# Patient Record
Sex: FEMALE | Race: BLACK OR AFRICAN AMERICAN | NOT HISPANIC OR LATINO | Employment: PART TIME | URBAN - METROPOLITAN AREA
[De-identification: names, ages, dates, MRNs, and addresses within clinical notes are randomized per-mention and may not be internally consistent; named-entity substitution may affect disease eponyms.]

---

## 2017-01-30 ENCOUNTER — HOSPITAL ENCOUNTER (OUTPATIENT)
Dept: LAB | Facility: MEDICAL CENTER | Age: 32
End: 2017-01-30
Attending: SPECIALIST
Payer: MEDICAID

## 2017-01-30 PROCEDURE — 87491 CHLMYD TRACH DNA AMP PROBE: CPT

## 2017-01-30 PROCEDURE — 87624 HPV HI-RISK TYP POOLED RSLT: CPT

## 2017-01-30 PROCEDURE — 88175 CYTOPATH C/V AUTO FLUID REDO: CPT

## 2017-01-30 PROCEDURE — 87591 N.GONORRHOEAE DNA AMP PROB: CPT

## 2017-02-02 LAB
C TRACH DNA GENITAL QL NAA+PROBE: NEGATIVE
CYTOLOGY REG CYTOL: ABNORMAL
HPV HR 12 DNA CVX QL NAA+PROBE: POSITIVE
HPV16 DNA SPEC QL NAA+PROBE: NEGATIVE
HPV18 DNA SPEC QL NAA+PROBE: NEGATIVE
N GONORRHOEA DNA GENITAL QL NAA+PROBE: NEGATIVE
SPECIMEN SOURCE: ABNORMAL
SPECIMEN SOURCE: ABNORMAL

## 2017-04-06 ENCOUNTER — HOSPITAL ENCOUNTER (OUTPATIENT)
Dept: LAB | Facility: MEDICAL CENTER | Age: 32
End: 2017-04-06
Attending: SPECIALIST
Payer: MEDICAID

## 2017-04-06 PROCEDURE — 88305 TISSUE EXAM BY PATHOLOGIST: CPT

## 2018-03-06 ENCOUNTER — HOSPITAL ENCOUNTER (EMERGENCY)
Dept: HOSPITAL 8 - ED | Age: 33
Discharge: HOME | End: 2018-03-06
Payer: MEDICAID

## 2018-03-06 VITALS — SYSTOLIC BLOOD PRESSURE: 115 MMHG | DIASTOLIC BLOOD PRESSURE: 70 MMHG

## 2018-03-06 VITALS — BODY MASS INDEX: 24.24 KG/M2 | HEIGHT: 65 IN | WEIGHT: 145.51 LBS

## 2018-03-06 DIAGNOSIS — R07.89: ICD-10-CM

## 2018-03-06 DIAGNOSIS — R20.2: ICD-10-CM

## 2018-03-06 DIAGNOSIS — G43.009: Primary | ICD-10-CM

## 2018-03-06 LAB
ALBUMIN SERPL-MCNC: 4 G/DL (ref 3.4–5)
ANION GAP SERPL CALC-SCNC: 8 MMOL/L (ref 5–15)
BASOPHILS # BLD AUTO: 0.03 X10^3/UL (ref 0–0.1)
BASOPHILS NFR BLD AUTO: 0 % (ref 0–1)
CALCIUM SERPL-MCNC: 8.8 MG/DL (ref 8.5–10.1)
CHLORIDE SERPL-SCNC: 108 MMOL/L (ref 98–107)
CREAT SERPL-MCNC: 0.96 MG/DL (ref 0.55–1.02)
EOSINOPHIL # BLD AUTO: 0.04 X10^3/UL (ref 0–0.4)
EOSINOPHIL NFR BLD AUTO: 1 % (ref 1–7)
ERYTHROCYTE [DISTWIDTH] IN BLOOD BY AUTOMATED COUNT: 12.9 % (ref 9.6–15.2)
LYMPHOCYTES # BLD AUTO: 3.29 X10^3/UL (ref 1–3.4)
LYMPHOCYTES NFR BLD AUTO: 44 % (ref 22–44)
MCH RBC QN AUTO: 31.7 PG (ref 27–34.8)
MCHC RBC AUTO-ENTMCNC: 33.7 G/DL (ref 32.4–35.8)
MCV RBC AUTO: 93.9 FL (ref 80–100)
MD: NO
MONOCYTES # BLD AUTO: 0.34 X10^3/UL (ref 0.2–0.8)
MONOCYTES NFR BLD AUTO: 5 % (ref 2–9)
NEUTROPHILS # BLD AUTO: 3.73 X10^3/UL (ref 1.8–6.8)
NEUTROPHILS NFR BLD AUTO: 50 % (ref 42–75)
PLATELET # BLD AUTO: 295 X10^3/UL (ref 130–400)
PMV BLD AUTO: 9 FL (ref 7.4–10.4)
RBC # BLD AUTO: 4.54 X10^6/UL (ref 3.82–5.3)
TROPONIN I SERPL-MCNC: < 0.015 NG/ML (ref 0–0.04)

## 2018-03-06 PROCEDURE — 99285 EMERGENCY DEPT VISIT HI MDM: CPT

## 2018-03-06 PROCEDURE — 70450 CT HEAD/BRAIN W/O DYE: CPT

## 2018-03-06 PROCEDURE — 82040 ASSAY OF SERUM ALBUMIN: CPT

## 2018-03-06 PROCEDURE — 80048 BASIC METABOLIC PNL TOTAL CA: CPT

## 2018-03-06 PROCEDURE — 93005 ELECTROCARDIOGRAM TRACING: CPT

## 2018-03-06 PROCEDURE — 85025 COMPLETE CBC W/AUTO DIFF WBC: CPT

## 2018-03-06 PROCEDURE — 96374 THER/PROPH/DIAG INJ IV PUSH: CPT

## 2018-03-06 PROCEDURE — 96375 TX/PRO/DX INJ NEW DRUG ADDON: CPT

## 2018-03-06 PROCEDURE — 71045 X-RAY EXAM CHEST 1 VIEW: CPT

## 2018-03-06 PROCEDURE — 84484 ASSAY OF TROPONIN QUANT: CPT

## 2018-03-06 PROCEDURE — 36415 COLL VENOUS BLD VENIPUNCTURE: CPT

## 2018-03-06 PROCEDURE — 96361 HYDRATE IV INFUSION ADD-ON: CPT

## 2018-10-04 ENCOUNTER — HOSPITAL ENCOUNTER (OUTPATIENT)
Facility: MEDICAL CENTER | Age: 33
End: 2018-10-04
Attending: PREVENTIVE MEDICINE
Payer: COMMERCIAL

## 2018-10-04 ENCOUNTER — EMPLOYEE HEALTH (OUTPATIENT)
Dept: OCCUPATIONAL MEDICINE | Facility: CLINIC | Age: 33
End: 2018-10-04

## 2018-10-04 ENCOUNTER — EH NON-PROVIDER (OUTPATIENT)
Dept: OCCUPATIONAL MEDICINE | Facility: CLINIC | Age: 33
End: 2018-10-04

## 2018-10-04 VITALS
HEIGHT: 65 IN | DIASTOLIC BLOOD PRESSURE: 70 MMHG | WEIGHT: 145 LBS | TEMPERATURE: 98.4 F | RESPIRATION RATE: 14 BRPM | HEART RATE: 76 BPM | SYSTOLIC BLOOD PRESSURE: 104 MMHG | BODY MASS INDEX: 24.16 KG/M2 | OXYGEN SATURATION: 99 %

## 2018-10-04 DIAGNOSIS — Z02.1 ENCOUNTER FOR PRE-EMPLOYMENT HEALTH SCREENING EXAMINATION: ICD-10-CM

## 2018-10-04 DIAGNOSIS — Z02.1 PRE-EMPLOYMENT DRUG SCREENING: ICD-10-CM

## 2018-10-04 LAB
AMP AMPHETAMINE: NORMAL
BAR BARBITURATES: NORMAL
BZO BENZODIAZEPINES: NORMAL
COC COCAINE: NORMAL
INT CON NEG: NORMAL
INT CON POS: NORMAL
MDMA ECSTASY: NORMAL
MET METHAMPHETAMINES: NORMAL
MTD METHADONE: NORMAL
OPI OPIATES: NORMAL
OXY OXYCODONE: NORMAL
PCP PHENCYCLIDINE: NORMAL
POC URINE DRUG SCREEN OCDRS: NORMAL
THC: NORMAL

## 2018-10-04 PROCEDURE — 80305 DRUG TEST PRSMV DIR OPT OBS: CPT | Performed by: PREVENTIVE MEDICINE

## 2018-10-04 PROCEDURE — 8915 PR COMPREHENSIVE PHYSICAL: Performed by: PREVENTIVE MEDICINE

## 2018-10-04 PROCEDURE — 90686 IIV4 VACC NO PRSV 0.5 ML IM: CPT | Performed by: PREVENTIVE MEDICINE

## 2018-10-04 PROCEDURE — 94375 RESPIRATORY FLOW VOLUME LOOP: CPT | Performed by: PREVENTIVE MEDICINE

## 2018-10-04 PROCEDURE — 86480 TB TEST CELL IMMUN MEASURE: CPT | Performed by: PREVENTIVE MEDICINE

## 2018-10-04 ASSESSMENT — VISUAL ACUITY
OD_CC: 20/20
OS_CC: 20/20

## 2018-10-04 NOTE — PROGRESS NOTES
Pre-employment physical exam. See scanned documents    Patient's body mass index is 24.5 kg/m². Exercise and nutrition counseling were performed at this visit.

## 2018-10-06 LAB
M TB TUBERC IFN-G BLD QL: NEGATIVE
M TB TUBERC IFN-G/MITOGEN IGNF BLD: 0
M TB TUBERC IGNF/MITOGEN IGNF CONTROL: 4.96 [IU]/ML
MITOGEN IGNF BCKGRD COR BLD-ACNC: 0.01 [IU]/ML

## 2018-10-12 ENCOUNTER — DOCUMENTATION (OUTPATIENT)
Dept: OCCUPATIONAL MEDICINE | Facility: CLINIC | Age: 33
End: 2018-10-12

## 2018-10-12 NOTE — PROGRESS NOTES
Pre-employment medical surveillance reviewed and signed. Quantiferon result documented in immunizations. Document returned to monthly assigned MA.    See scanned documents.